# Patient Record
Sex: MALE | Race: WHITE | NOT HISPANIC OR LATINO | URBAN - METROPOLITAN AREA
[De-identification: names, ages, dates, MRNs, and addresses within clinical notes are randomized per-mention and may not be internally consistent; named-entity substitution may affect disease eponyms.]

---

## 2017-06-27 ENCOUNTER — NEW REFERRAL (OUTPATIENT)
Dept: URBAN - METROPOLITAN AREA CLINIC 14 | Facility: CLINIC | Age: 52
End: 2017-06-27

## 2017-06-27 DIAGNOSIS — H44.23: ICD-10-CM

## 2017-06-27 DIAGNOSIS — H35.372: ICD-10-CM

## 2017-06-27 DIAGNOSIS — H25.812: ICD-10-CM

## 2017-06-27 PROCEDURE — 92225 OPHTHALMOSCOPY (INITIAL): CPT

## 2017-06-27 PROCEDURE — 92134 CPTRZ OPH DX IMG PST SGM RTA: CPT

## 2017-06-27 PROCEDURE — G8427 DOCREV CUR MEDS BY ELIG CLIN: HCPCS

## 2017-06-27 PROCEDURE — 1036F TOBACCO NON-USER: CPT

## 2017-06-27 PROCEDURE — 99244 OFF/OP CNSLTJ NEW/EST MOD 40: CPT

## 2017-06-27 ASSESSMENT — VISUAL ACUITY
OS_CC: 20/100-1
OD_CC: 20/25
OS_PH: 20/40-1
OD_PH: 20/20

## 2017-06-27 ASSESSMENT — TONOMETRY
OS_IOP_MMHG: 16
OD_IOP_MMHG: 15

## 2018-04-21 ENCOUNTER — PROBLEM (OUTPATIENT)
Dept: URBAN - METROPOLITAN AREA CLINIC 19 | Facility: CLINIC | Age: 53
End: 2018-04-21

## 2018-04-21 DIAGNOSIS — S05.12XA: ICD-10-CM

## 2018-04-21 DIAGNOSIS — H25.812: ICD-10-CM

## 2018-04-21 DIAGNOSIS — H44.23: ICD-10-CM

## 2018-04-21 DIAGNOSIS — H35.372: ICD-10-CM

## 2018-04-21 DIAGNOSIS — H20.012: ICD-10-CM

## 2018-04-21 DIAGNOSIS — H27.122: ICD-10-CM

## 2018-04-21 PROCEDURE — 92134 CPTRZ OPH DX IMG PST SGM RTA: CPT

## 2018-04-21 PROCEDURE — 4040F PNEUMOC VAC/ADMIN/RCVD: CPT | Mod: 8P

## 2018-04-21 PROCEDURE — 92226 OPHTHALMOSCOPY (SUB): CPT

## 2018-04-21 PROCEDURE — 1036F TOBACCO NON-USER: CPT

## 2018-04-21 PROCEDURE — G8427 DOCREV CUR MEDS BY ELIG CLIN: HCPCS

## 2018-04-21 PROCEDURE — 92014 COMPRE OPH EXAM EST PT 1/>: CPT

## 2018-04-21 ASSESSMENT — VISUAL ACUITY
OS_PH: 20/25+2
OD_CC: 20/20
OS_SC: 20/40

## 2018-04-21 ASSESSMENT — TONOMETRY
OD_IOP_MMHG: 07
OS_IOP_MMHG: 11

## 2018-04-26 ENCOUNTER — FOLLOW UP (OUTPATIENT)
Dept: URBAN - METROPOLITAN AREA CLINIC 32 | Facility: CLINIC | Age: 53
End: 2018-04-26

## 2018-04-26 DIAGNOSIS — S05.12XD: ICD-10-CM

## 2018-04-26 DIAGNOSIS — H25.812: ICD-10-CM

## 2018-04-26 DIAGNOSIS — H27.122: ICD-10-CM

## 2018-04-26 DIAGNOSIS — H35.372: ICD-10-CM

## 2018-04-26 DIAGNOSIS — H20.012: ICD-10-CM

## 2018-04-26 DIAGNOSIS — H44.23: ICD-10-CM

## 2018-04-26 PROCEDURE — 92134 CPTRZ OPH DX IMG PST SGM RTA: CPT

## 2018-04-26 PROCEDURE — 92226 OPHTHALMOSCOPY (SUB): CPT

## 2018-04-26 PROCEDURE — 1036F TOBACCO NON-USER: CPT

## 2018-04-26 PROCEDURE — 92014 COMPRE OPH EXAM EST PT 1/>: CPT

## 2018-04-26 PROCEDURE — G8427 DOCREV CUR MEDS BY ELIG CLIN: HCPCS

## 2018-04-26 ASSESSMENT — VISUAL ACUITY
OD_SC: 20/20
OS_SC: 20/20

## 2018-04-26 ASSESSMENT — TONOMETRY
OD_IOP_MMHG: 15
OS_IOP_MMHG: 21

## 2018-05-22 ENCOUNTER — FOLLOW UP (OUTPATIENT)
Dept: URBAN - METROPOLITAN AREA CLINIC 32 | Facility: CLINIC | Age: 53
End: 2018-05-22

## 2018-05-22 DIAGNOSIS — H20.012: ICD-10-CM

## 2018-05-22 DIAGNOSIS — S05.12XD: ICD-10-CM

## 2018-05-22 DIAGNOSIS — H27.122: ICD-10-CM

## 2018-05-22 PROCEDURE — 99213 OFFICE O/P EST LOW 20 MIN: CPT

## 2018-05-22 ASSESSMENT — TONOMETRY: OS_IOP_MMHG: 12

## 2018-05-22 ASSESSMENT — VISUAL ACUITY
OD_SC: 20/16
OS_SC: 20/16

## 2018-07-31 ENCOUNTER — FOLLOW UP (OUTPATIENT)
Dept: URBAN - METROPOLITAN AREA CLINIC 32 | Facility: CLINIC | Age: 53
End: 2018-07-31

## 2018-07-31 DIAGNOSIS — H44.23: ICD-10-CM

## 2018-07-31 DIAGNOSIS — H20.012: ICD-10-CM

## 2018-07-31 DIAGNOSIS — H27.122: ICD-10-CM

## 2018-07-31 DIAGNOSIS — S05.12XD: ICD-10-CM

## 2018-07-31 PROCEDURE — 92014 COMPRE OPH EXAM EST PT 1/>: CPT

## 2018-07-31 PROCEDURE — 92134 CPTRZ OPH DX IMG PST SGM RTA: CPT

## 2018-07-31 PROCEDURE — 92226 OPHTHALMOSCOPY (SUB): CPT

## 2018-07-31 PROCEDURE — 1036F TOBACCO NON-USER: CPT

## 2018-07-31 PROCEDURE — G8427 DOCREV CUR MEDS BY ELIG CLIN: HCPCS

## 2018-07-31 ASSESSMENT — TONOMETRY: OS_IOP_MMHG: 17

## 2018-07-31 ASSESSMENT — VISUAL ACUITY
OS_SC: 20/25
OD_CC: 20/20-1

## 2019-05-04 ENCOUNTER — PROBLEM (OUTPATIENT)
Dept: URBAN - METROPOLITAN AREA CLINIC 51 | Facility: CLINIC | Age: 54
End: 2019-05-04

## 2019-05-04 DIAGNOSIS — H44.23: ICD-10-CM

## 2019-05-04 DIAGNOSIS — H27.122: ICD-10-CM

## 2019-05-04 DIAGNOSIS — S05.12XD: ICD-10-CM

## 2019-05-04 DIAGNOSIS — H20.012: ICD-10-CM

## 2019-05-04 DIAGNOSIS — H35.372: ICD-10-CM

## 2019-05-04 PROCEDURE — 92226 OPHTHALMOSCOPY (SUB): CPT

## 2019-05-04 PROCEDURE — 92134 CPTRZ OPH DX IMG PST SGM RTA: CPT

## 2019-05-04 PROCEDURE — 92014 COMPRE OPH EXAM EST PT 1/>: CPT

## 2019-05-04 ASSESSMENT — VISUAL ACUITY
OS_SC: 20/30-1
OD_PH: 20/150
OS_PH: 20/20
OD_SC: CF 3FT

## 2019-05-04 ASSESSMENT — TONOMETRY
OD_IOP_MMHG: 16
OS_IOP_MMHG: 13

## 2019-05-14 ENCOUNTER — FOLLOW UP (OUTPATIENT)
Dept: URBAN - METROPOLITAN AREA CLINIC 14 | Facility: CLINIC | Age: 54
End: 2019-05-14

## 2019-05-14 DIAGNOSIS — S05.12XD: ICD-10-CM

## 2019-05-14 DIAGNOSIS — H20.012: ICD-10-CM

## 2019-05-14 DIAGNOSIS — H27.122: ICD-10-CM

## 2019-05-14 DIAGNOSIS — H44.23: ICD-10-CM

## 2019-05-14 DIAGNOSIS — H35.372: ICD-10-CM

## 2019-05-14 PROCEDURE — 92014 COMPRE OPH EXAM EST PT 1/>: CPT

## 2019-05-14 PROCEDURE — 92226 OPHTHALMOSCOPY (SUB): CPT

## 2019-05-14 ASSESSMENT — VISUAL ACUITY
OS_SC: 20/20+1
OD_CC: 20/16

## 2019-05-28 ENCOUNTER — FOLLOW UP (OUTPATIENT)
Dept: URBAN - METROPOLITAN AREA CLINIC 14 | Facility: CLINIC | Age: 54
End: 2019-05-28

## 2019-05-28 DIAGNOSIS — H27.122: ICD-10-CM

## 2019-05-28 DIAGNOSIS — H20.012: ICD-10-CM

## 2019-05-28 DIAGNOSIS — H35.372: ICD-10-CM

## 2019-05-28 PROCEDURE — 92012 INTRM OPH EXAM EST PATIENT: CPT

## 2019-05-28 PROCEDURE — 92134 CPTRZ OPH DX IMG PST SGM RTA: CPT

## 2019-05-28 PROCEDURE — 92226 OPHTHALMOSCOPY (SUB): CPT

## 2019-05-28 ASSESSMENT — VISUAL ACUITY
OD_CC: 20/20+3
OS_SC: 20/20

## 2019-05-28 ASSESSMENT — TONOMETRY
OS_IOP_MMHG: 16
OD_IOP_MMHG: 14

## 2019-06-18 ENCOUNTER — FOLLOW UP (OUTPATIENT)
Dept: URBAN - METROPOLITAN AREA CLINIC 14 | Facility: CLINIC | Age: 54
End: 2019-06-18

## 2019-06-18 DIAGNOSIS — H35.372: ICD-10-CM

## 2019-06-18 DIAGNOSIS — H27.122: ICD-10-CM

## 2019-06-18 DIAGNOSIS — H20.012: ICD-10-CM

## 2019-06-18 PROCEDURE — 92134 CPTRZ OPH DX IMG PST SGM RTA: CPT

## 2019-06-18 PROCEDURE — 92014 COMPRE OPH EXAM EST PT 1/>: CPT

## 2019-06-18 PROCEDURE — 92226 OPHTHALMOSCOPY (SUB): CPT

## 2019-06-18 ASSESSMENT — VISUAL ACUITY
OD_CC: 20/16-1
OS_SC: 20/20-1

## 2019-06-18 ASSESSMENT — TONOMETRY: OS_IOP_MMHG: 13

## 2020-01-30 ENCOUNTER — FOLLOW UP (OUTPATIENT)
Dept: URBAN - METROPOLITAN AREA CLINIC 14 | Facility: CLINIC | Age: 55
End: 2020-01-30

## 2020-01-30 DIAGNOSIS — H35.372: ICD-10-CM

## 2020-01-30 DIAGNOSIS — H27.122: ICD-10-CM

## 2020-01-30 DIAGNOSIS — H20.012: ICD-10-CM

## 2020-01-30 DIAGNOSIS — H27.112: ICD-10-CM

## 2020-01-30 PROCEDURE — 92134 CPTRZ OPH DX IMG PST SGM RTA: CPT

## 2020-01-30 PROCEDURE — 92014 COMPRE OPH EXAM EST PT 1/>: CPT

## 2020-01-30 PROCEDURE — 92202 OPSCPY EXTND ON/MAC DRAW: CPT

## 2020-01-30 ASSESSMENT — TONOMETRY
OD_IOP_MMHG: 13
OS_IOP_MMHG: 13

## 2020-01-30 ASSESSMENT — VISUAL ACUITY
OS_PH: 20/50
OD_SC: 20/20-2

## 2020-02-03 ENCOUNTER — PROBLEM (OUTPATIENT)
Dept: URBAN - METROPOLITAN AREA CLINIC 32 | Facility: CLINIC | Age: 55
End: 2020-02-03

## 2020-02-03 DIAGNOSIS — H27.122: ICD-10-CM

## 2020-02-03 DIAGNOSIS — H27.112: ICD-10-CM

## 2020-02-03 DIAGNOSIS — H35.372: ICD-10-CM

## 2020-02-03 DIAGNOSIS — H20.012: ICD-10-CM

## 2020-02-03 PROCEDURE — 92202 OPSCPY EXTND ON/MAC DRAW: CPT

## 2020-02-03 PROCEDURE — 92014 COMPRE OPH EXAM EST PT 1/>: CPT

## 2020-02-03 ASSESSMENT — TONOMETRY: OS_IOP_MMHG: 7

## 2020-02-03 ASSESSMENT — VISUAL ACUITY
OD_CC: 20/16
OS_PH: 20/60-1
OS_SC: 10/200

## 2020-02-18 ENCOUNTER — SURGERY/PROCEDURE (OUTPATIENT)
Dept: URBAN - METROPOLITAN AREA MEDICAL CENTER 2 | Facility: MEDICAL CENTER | Age: 55
End: 2020-02-18

## 2020-02-18 DIAGNOSIS — H27.112: ICD-10-CM

## 2020-02-18 PROCEDURE — 66825 REPOSITION INTRAOCULAR LENS: CPT

## 2020-02-18 PROCEDURE — 67036 REMOVAL OF INNER EYE FLUID: CPT

## 2020-02-19 ENCOUNTER — 1 DAY POST-OP (OUTPATIENT)
Dept: URBAN - METROPOLITAN AREA CLINIC 32 | Facility: CLINIC | Age: 55
End: 2020-02-19

## 2020-02-19 DIAGNOSIS — H27.112: ICD-10-CM

## 2020-02-19 DIAGNOSIS — H20.012: ICD-10-CM

## 2020-02-19 DIAGNOSIS — H35.372: ICD-10-CM

## 2020-02-19 DIAGNOSIS — H27.122: ICD-10-CM

## 2020-02-19 PROCEDURE — 92202 OPSCPY EXTND ON/MAC DRAW: CPT

## 2020-02-19 PROCEDURE — 99024 POSTOP FOLLOW-UP VISIT: CPT

## 2020-02-19 ASSESSMENT — VISUAL ACUITY
OS_SC: 2/200
OD_CC: 20/16

## 2020-02-19 ASSESSMENT — TONOMETRY: OS_IOP_MMHG: 9

## 2020-02-20 ENCOUNTER — PROBLEM (OUTPATIENT)
Dept: URBAN - METROPOLITAN AREA CLINIC 14 | Facility: CLINIC | Age: 55
End: 2020-02-20

## 2020-02-20 DIAGNOSIS — H20.012: ICD-10-CM

## 2020-02-20 DIAGNOSIS — H27.112: ICD-10-CM

## 2020-02-20 DIAGNOSIS — H35.372: ICD-10-CM

## 2020-02-20 DIAGNOSIS — H27.122: ICD-10-CM

## 2020-02-20 PROCEDURE — 92202 OPSCPY EXTND ON/MAC DRAW: CPT

## 2020-02-20 PROCEDURE — 99024 POSTOP FOLLOW-UP VISIT: CPT

## 2020-02-20 ASSESSMENT — TONOMETRY: OS_IOP_MMHG: 10

## 2020-02-20 ASSESSMENT — VISUAL ACUITY
OD_SC: 20/16
OS_SC: 1/200

## 2020-02-23 ENCOUNTER — PROBLEM (OUTPATIENT)
Dept: URBAN - METROPOLITAN AREA CLINIC 96 | Facility: CLINIC | Age: 55
End: 2020-02-23

## 2020-02-23 DIAGNOSIS — H35.372: ICD-10-CM

## 2020-02-23 DIAGNOSIS — H40.052: ICD-10-CM

## 2020-02-23 DIAGNOSIS — H43.12: ICD-10-CM

## 2020-02-23 DIAGNOSIS — H27.112: ICD-10-CM

## 2020-02-23 DIAGNOSIS — H27.122: ICD-10-CM

## 2020-02-23 DIAGNOSIS — H20.012: ICD-10-CM

## 2020-02-23 PROCEDURE — 92201 OPSCPY EXTND RTA DRAW UNI/BI: CPT

## 2020-02-23 PROCEDURE — 92014 COMPRE OPH EXAM EST PT 1/>: CPT

## 2020-02-24 ENCOUNTER — PROBLEM (OUTPATIENT)
Dept: URBAN - METROPOLITAN AREA CLINIC 19 | Facility: CLINIC | Age: 55
End: 2020-02-24

## 2020-02-24 DIAGNOSIS — H27.112: ICD-10-CM

## 2020-02-24 DIAGNOSIS — H40.052: ICD-10-CM

## 2020-02-24 PROCEDURE — 99024 POSTOP FOLLOW-UP VISIT: CPT

## 2020-02-24 ASSESSMENT — TONOMETRY
OD_IOP_MMHG: 13
OS_IOP_MMHG: 10
OD_IOP_MMHG: 54
OD_IOP_MMHG: 43

## 2020-02-24 ASSESSMENT — VISUAL ACUITY
OD_SC: 20/20
OS_SC: 10/200

## 2020-02-25 ENCOUNTER — SAME DAY PO (OUTPATIENT)
Dept: URBAN - METROPOLITAN AREA CLINIC 96 | Facility: CLINIC | Age: 55
End: 2020-02-25

## 2020-02-25 ENCOUNTER — SURGERY/PROCEDURE (OUTPATIENT)
Dept: URBAN - METROPOLITAN AREA MEDICAL CENTER 2 | Facility: MEDICAL CENTER | Age: 55
End: 2020-02-25

## 2020-02-25 DIAGNOSIS — H20.012: ICD-10-CM

## 2020-02-25 DIAGNOSIS — H40.052: ICD-10-CM

## 2020-02-25 DIAGNOSIS — H27.112: ICD-10-CM

## 2020-02-25 PROCEDURE — 66825 REPOSITION INTRAOCULAR LENS: CPT

## 2020-02-25 PROCEDURE — 92201 OPSCPY EXTND RTA DRAW UNI/BI: CPT

## 2020-02-25 PROCEDURE — 99024 POSTOP FOLLOW-UP VISIT: CPT

## 2020-02-25 PROCEDURE — 67036 REMOVAL OF INNER EYE FLUID: CPT

## 2020-02-25 ASSESSMENT — VISUAL ACUITY
OS_SC: 4/200
OD_SC: 20/16

## 2020-02-27 ENCOUNTER — POST-OP CHECK (OUTPATIENT)
Dept: URBAN - METROPOLITAN AREA CLINIC 14 | Facility: CLINIC | Age: 55
End: 2020-02-27

## 2020-02-27 DIAGNOSIS — H27.122: ICD-10-CM

## 2020-02-27 DIAGNOSIS — H35.372: ICD-10-CM

## 2020-02-27 DIAGNOSIS — H27.112: ICD-10-CM

## 2020-02-27 DIAGNOSIS — H40.052: ICD-10-CM

## 2020-02-27 PROCEDURE — 92202 OPSCPY EXTND ON/MAC DRAW: CPT

## 2020-02-27 PROCEDURE — 99024 POSTOP FOLLOW-UP VISIT: CPT

## 2020-02-27 PROCEDURE — 92134 CPTRZ OPH DX IMG PST SGM RTA: CPT

## 2020-02-27 ASSESSMENT — TONOMETRY
OS_IOP_MMHG: 6
OS_IOP_MMHG: 5
OD_IOP_MMHG: 13

## 2020-02-27 ASSESSMENT — VISUAL ACUITY
OD_SC: 20/16-3
OS_SC: 4/200

## 2020-03-02 ENCOUNTER — POST-OP CHECK (OUTPATIENT)
Dept: URBAN - METROPOLITAN AREA CLINIC 14 | Facility: CLINIC | Age: 55
End: 2020-03-02

## 2020-03-02 DIAGNOSIS — H27.112: ICD-10-CM

## 2020-03-02 DIAGNOSIS — H40.052: ICD-10-CM

## 2020-03-02 PROCEDURE — 92201 OPSCPY EXTND RTA DRAW UNI/BI: CPT

## 2020-03-02 PROCEDURE — 99024 POSTOP FOLLOW-UP VISIT: CPT

## 2020-03-02 PROCEDURE — 92134 CPTRZ OPH DX IMG PST SGM RTA: CPT

## 2020-03-02 ASSESSMENT — TONOMETRY
OS_IOP_MMHG: 28
OD_IOP_MMHG: 15

## 2020-03-02 ASSESSMENT — VISUAL ACUITY
OD_SC: 20/16
OS_SC: 10/200

## 2020-03-10 ENCOUNTER — 1 WEEK POST-OP (OUTPATIENT)
Dept: URBAN - METROPOLITAN AREA CLINIC 14 | Facility: CLINIC | Age: 55
End: 2020-03-10

## 2020-03-10 DIAGNOSIS — H35.352: ICD-10-CM

## 2020-03-10 DIAGNOSIS — H35.372: ICD-10-CM

## 2020-03-10 DIAGNOSIS — H44.23: ICD-10-CM

## 2020-03-10 DIAGNOSIS — H40.052: ICD-10-CM

## 2020-03-10 DIAGNOSIS — H27.112: ICD-10-CM

## 2020-03-10 PROCEDURE — 99024 POSTOP FOLLOW-UP VISIT: CPT

## 2020-03-10 PROCEDURE — 92202 OPSCPY EXTND ON/MAC DRAW: CPT

## 2020-03-10 PROCEDURE — 92134 CPTRZ OPH DX IMG PST SGM RTA: CPT

## 2020-03-10 ASSESSMENT — VISUAL ACUITY
OS_SC: 20/125
OS_PH: 20/80-2
OD_CC: 20/16

## 2020-03-10 ASSESSMENT — TONOMETRY
OS_IOP_MMHG: 21
OD_IOP_MMHG: 17

## 2020-03-24 ENCOUNTER — POST-OP CHECK (OUTPATIENT)
Dept: URBAN - METROPOLITAN AREA CLINIC 14 | Facility: CLINIC | Age: 55
End: 2020-03-24

## 2020-03-24 DIAGNOSIS — H27.112: ICD-10-CM

## 2020-03-24 DIAGNOSIS — H40.052: ICD-10-CM

## 2020-03-24 DIAGNOSIS — H35.352: ICD-10-CM

## 2020-03-24 DIAGNOSIS — H35.372: ICD-10-CM

## 2020-03-24 PROCEDURE — 92134 CPTRZ OPH DX IMG PST SGM RTA: CPT

## 2020-03-24 PROCEDURE — 99024 POSTOP FOLLOW-UP VISIT: CPT

## 2020-03-24 PROCEDURE — 92202 OPSCPY EXTND ON/MAC DRAW: CPT

## 2020-03-24 ASSESSMENT — VISUAL ACUITY
OD_CC: 20/16
OS_CC: 20/125

## 2020-03-24 ASSESSMENT — TONOMETRY: OS_IOP_MMHG: 12

## 2020-04-14 ENCOUNTER — POST-OP CHECK (OUTPATIENT)
Dept: URBAN - METROPOLITAN AREA CLINIC 14 | Facility: CLINIC | Age: 55
End: 2020-04-14

## 2020-04-14 DIAGNOSIS — H40.052: ICD-10-CM

## 2020-04-14 DIAGNOSIS — H35.352: ICD-10-CM

## 2020-04-14 DIAGNOSIS — H27.112: ICD-10-CM

## 2020-04-14 DIAGNOSIS — H35.372: ICD-10-CM

## 2020-04-14 PROCEDURE — 99024 POSTOP FOLLOW-UP VISIT: CPT

## 2020-04-14 PROCEDURE — 92202 OPSCPY EXTND ON/MAC DRAW: CPT

## 2020-04-14 PROCEDURE — 92134 CPTRZ OPH DX IMG PST SGM RTA: CPT

## 2020-04-14 ASSESSMENT — VISUAL ACUITY
OD_CC: 20/16
OS_SC: 20/50-2
OS_PH: 20/40

## 2020-04-14 ASSESSMENT — TONOMETRY: OS_IOP_MMHG: 13

## 2020-05-16 ENCOUNTER — POST-OP CHECK (OUTPATIENT)
Dept: URBAN - METROPOLITAN AREA CLINIC 14 | Facility: CLINIC | Age: 55
End: 2020-05-16

## 2020-05-16 VITALS — HEIGHT: 60 IN

## 2020-05-16 DIAGNOSIS — H44.23: ICD-10-CM

## 2020-05-16 DIAGNOSIS — H35.352: ICD-10-CM

## 2020-05-16 DIAGNOSIS — H35.372: ICD-10-CM

## 2020-05-16 DIAGNOSIS — H27.112: ICD-10-CM

## 2020-05-16 PROCEDURE — 92134 CPTRZ OPH DX IMG PST SGM RTA: CPT

## 2020-05-16 PROCEDURE — 99024 POSTOP FOLLOW-UP VISIT: CPT

## 2020-05-16 PROCEDURE — 92202 OPSCPY EXTND ON/MAC DRAW: CPT

## 2020-05-16 ASSESSMENT — VISUAL ACUITY
OS_SC: 20/80+2
OS_CC: 20/60-3
OD_CC: 20/16

## 2020-05-16 ASSESSMENT — TONOMETRY: OS_IOP_MMHG: 16

## 2020-06-18 ENCOUNTER — FOLLOW UP (OUTPATIENT)
Dept: URBAN - METROPOLITAN AREA CLINIC 14 | Facility: CLINIC | Age: 55
End: 2020-06-18

## 2020-06-18 DIAGNOSIS — H35.352: ICD-10-CM

## 2020-06-18 DIAGNOSIS — H27.112: ICD-10-CM

## 2020-06-18 DIAGNOSIS — H20.012: ICD-10-CM

## 2020-06-18 DIAGNOSIS — H44.23: ICD-10-CM

## 2020-06-18 DIAGNOSIS — H35.372: ICD-10-CM

## 2020-06-18 DIAGNOSIS — H27.122: ICD-10-CM

## 2020-06-18 PROCEDURE — 92134 CPTRZ OPH DX IMG PST SGM RTA: CPT

## 2020-06-18 PROCEDURE — 92202 OPSCPY EXTND ON/MAC DRAW: CPT

## 2020-06-18 PROCEDURE — 92014 COMPRE OPH EXAM EST PT 1/>: CPT

## 2020-06-18 ASSESSMENT — VISUAL ACUITY
OD_CC: 20/16
OS_CC: 20/80

## 2020-06-18 ASSESSMENT — TONOMETRY: OS_IOP_MMHG: 18

## 2020-08-03 ENCOUNTER — FOLLOW UP (OUTPATIENT)
Dept: URBAN - METROPOLITAN AREA CLINIC 14 | Facility: CLINIC | Age: 55
End: 2020-08-03

## 2020-08-03 VITALS — HEIGHT: 60 IN

## 2020-08-03 DIAGNOSIS — H35.352: ICD-10-CM

## 2020-08-03 DIAGNOSIS — H44.23: ICD-10-CM

## 2020-08-03 DIAGNOSIS — H27.112: ICD-10-CM

## 2020-08-03 DIAGNOSIS — H35.372: ICD-10-CM

## 2020-08-03 DIAGNOSIS — H27.122: ICD-10-CM

## 2020-08-03 PROCEDURE — 92134 CPTRZ OPH DX IMG PST SGM RTA: CPT

## 2020-08-03 PROCEDURE — 92202 OPSCPY EXTND ON/MAC DRAW: CPT

## 2020-08-03 PROCEDURE — 92014 COMPRE OPH EXAM EST PT 1/>: CPT

## 2020-08-03 ASSESSMENT — TONOMETRY: OS_IOP_MMHG: 15

## 2020-08-03 ASSESSMENT — VISUAL ACUITY
OD_CC: 20/20+1
OS_PH: 20/100
OS_SC: 10/200

## 2020-08-11 ENCOUNTER — SURGERY/PROCEDURE (OUTPATIENT)
Dept: URBAN - METROPOLITAN AREA MEDICAL CENTER 2 | Facility: MEDICAL CENTER | Age: 55
End: 2020-08-11

## 2020-08-11 DIAGNOSIS — H27.132: ICD-10-CM

## 2020-08-11 PROCEDURE — 66986 EXCHANGE LENS PROSTHESIS: CPT

## 2020-08-11 PROCEDURE — 67036 REMOVAL OF INNER EYE FLUID: CPT

## 2020-08-12 ENCOUNTER — 1 DAY POST-OP (OUTPATIENT)
Dept: URBAN - METROPOLITAN AREA CLINIC 19 | Facility: CLINIC | Age: 55
End: 2020-08-12

## 2020-08-12 DIAGNOSIS — H44.442: ICD-10-CM

## 2020-08-12 DIAGNOSIS — H27.122: ICD-10-CM

## 2020-08-12 PROCEDURE — 92201 OPSCPY EXTND RTA DRAW UNI/BI: CPT

## 2020-08-12 PROCEDURE — 99024 POSTOP FOLLOW-UP VISIT: CPT

## 2020-08-12 ASSESSMENT — TONOMETRY
OD_IOP_MMHG: 15
OS_IOP_MMHG: 3

## 2020-08-12 ASSESSMENT — VISUAL ACUITY
OS_SC: 4/200
OD_CC: 20/20

## 2020-08-13 ENCOUNTER — POST-OP CHECK (OUTPATIENT)
Dept: URBAN - METROPOLITAN AREA CLINIC 14 | Facility: CLINIC | Age: 55
End: 2020-08-13

## 2020-08-13 VITALS — HEIGHT: 60 IN

## 2020-08-13 DIAGNOSIS — H27.122: ICD-10-CM

## 2020-08-13 DIAGNOSIS — H44.442: ICD-10-CM

## 2020-08-13 PROCEDURE — 99024 POSTOP FOLLOW-UP VISIT: CPT

## 2020-08-13 PROCEDURE — 92134 CPTRZ OPH DX IMG PST SGM RTA: CPT

## 2020-08-13 PROCEDURE — 76512 OPH US DX B-SCAN: CPT

## 2020-08-13 ASSESSMENT — TONOMETRY
OD_IOP_MMHG: 18
OS_IOP_MMHG: 16

## 2020-08-13 ASSESSMENT — VISUAL ACUITY
OS_SC: 20/100
OD_CC: 20/16

## 2020-08-17 ENCOUNTER — POST-OP CHECK (OUTPATIENT)
Dept: URBAN - METROPOLITAN AREA CLINIC 14 | Facility: CLINIC | Age: 55
End: 2020-08-17

## 2020-08-17 VITALS — HEIGHT: 60 IN

## 2020-08-17 DIAGNOSIS — H27.122: ICD-10-CM

## 2020-08-17 DIAGNOSIS — H44.442: ICD-10-CM

## 2020-08-17 PROCEDURE — 92134 CPTRZ OPH DX IMG PST SGM RTA: CPT

## 2020-08-17 PROCEDURE — 99024 POSTOP FOLLOW-UP VISIT: CPT

## 2020-08-17 PROCEDURE — 92202 OPSCPY EXTND ON/MAC DRAW: CPT

## 2020-08-17 ASSESSMENT — VISUAL ACUITY
OD_CC: 20/16
OS_SC: 20/160

## 2020-08-17 ASSESSMENT — TONOMETRY: OS_IOP_MMHG: 13

## 2020-08-31 ENCOUNTER — POST-OP CHECK (OUTPATIENT)
Dept: URBAN - METROPOLITAN AREA CLINIC 14 | Facility: CLINIC | Age: 55
End: 2020-08-31

## 2020-08-31 DIAGNOSIS — H35.352: ICD-10-CM

## 2020-08-31 DIAGNOSIS — H35.372: ICD-10-CM

## 2020-08-31 DIAGNOSIS — H27.122: ICD-10-CM

## 2020-08-31 DIAGNOSIS — H44.23: ICD-10-CM

## 2020-08-31 PROCEDURE — 92202 OPSCPY EXTND ON/MAC DRAW: CPT

## 2020-08-31 PROCEDURE — 99024 POSTOP FOLLOW-UP VISIT: CPT

## 2020-08-31 PROCEDURE — 92134 CPTRZ OPH DX IMG PST SGM RTA: CPT

## 2020-08-31 ASSESSMENT — TONOMETRY: OS_IOP_MMHG: 16

## 2020-08-31 ASSESSMENT — VISUAL ACUITY: OD_CC: 20/16

## 2020-09-21 ENCOUNTER — POST-OP CHECK (OUTPATIENT)
Dept: URBAN - METROPOLITAN AREA CLINIC 14 | Facility: CLINIC | Age: 55
End: 2020-09-21

## 2020-09-21 VITALS — HEIGHT: 60 IN

## 2020-09-21 DIAGNOSIS — H35.352: ICD-10-CM

## 2020-09-21 DIAGNOSIS — H27.112: ICD-10-CM

## 2020-09-21 DIAGNOSIS — H44.23: ICD-10-CM

## 2020-09-21 DIAGNOSIS — H27.122: ICD-10-CM

## 2020-09-21 DIAGNOSIS — H35.372: ICD-10-CM

## 2020-09-21 PROCEDURE — 92134 CPTRZ OPH DX IMG PST SGM RTA: CPT

## 2020-09-21 PROCEDURE — 92250 FUNDUS PHOTOGRAPHY W/I&R: CPT

## 2020-09-21 PROCEDURE — 99024 POSTOP FOLLOW-UP VISIT: CPT

## 2020-09-21 PROCEDURE — 92202 OPSCPY EXTND ON/MAC DRAW: CPT

## 2020-09-21 PROCEDURE — 92235 FLUORESCEIN ANGRPH MLTIFRAME: CPT

## 2020-09-21 ASSESSMENT — TONOMETRY
OD_IOP_MMHG: 10
OS_IOP_MMHG: 12

## 2020-09-21 ASSESSMENT — VISUAL ACUITY
OD_CC: 20/16
OS_AM: 20/70
OS_CC: 10/200

## 2020-10-19 ENCOUNTER — POST-OP CHECK (OUTPATIENT)
Dept: URBAN - METROPOLITAN AREA CLINIC 14 | Facility: CLINIC | Age: 55
End: 2020-10-19

## 2020-10-19 DIAGNOSIS — H27.112: ICD-10-CM

## 2020-10-19 DIAGNOSIS — H44.23: ICD-10-CM

## 2020-10-19 DIAGNOSIS — H35.352: ICD-10-CM

## 2020-10-19 DIAGNOSIS — H27.122: ICD-10-CM

## 2020-10-19 DIAGNOSIS — H35.372: ICD-10-CM

## 2020-10-19 PROCEDURE — 92202 OPSCPY EXTND ON/MAC DRAW: CPT

## 2020-10-19 PROCEDURE — 99024 POSTOP FOLLOW-UP VISIT: CPT

## 2020-10-19 PROCEDURE — 92134 CPTRZ OPH DX IMG PST SGM RTA: CPT

## 2020-10-19 ASSESSMENT — VISUAL ACUITY
OS_PH: 20/160-1
OD_CC: 20/16-2

## 2020-10-19 ASSESSMENT — TONOMETRY
OS_IOP_MMHG: 16
OD_IOP_MMHG: 17

## 2021-01-18 ENCOUNTER — FOLLOW UP (OUTPATIENT)
Dept: URBAN - METROPOLITAN AREA CLINIC 14 | Facility: CLINIC | Age: 56
End: 2021-01-18

## 2021-01-18 VITALS — HEIGHT: 60 IN

## 2021-01-18 DIAGNOSIS — H44.23: ICD-10-CM

## 2021-01-18 DIAGNOSIS — H35.372: ICD-10-CM

## 2021-01-18 DIAGNOSIS — H35.352: ICD-10-CM

## 2021-01-18 DIAGNOSIS — H27.122: ICD-10-CM

## 2021-01-18 DIAGNOSIS — H27.112: ICD-10-CM

## 2021-01-18 PROCEDURE — 92202 OPSCPY EXTND ON/MAC DRAW: CPT

## 2021-01-18 PROCEDURE — 92014 COMPRE OPH EXAM EST PT 1/>: CPT

## 2021-01-18 PROCEDURE — 92134 CPTRZ OPH DX IMG PST SGM RTA: CPT

## 2021-01-18 ASSESSMENT — VISUAL ACUITY
OS_CC: 20/200
OD_CC: 20/16

## 2021-01-18 ASSESSMENT — TONOMETRY
OS_IOP_MMHG: 17
OD_IOP_MMHG: 18

## 2021-08-30 ENCOUNTER — FOLLOW UP (OUTPATIENT)
Dept: URBAN - METROPOLITAN AREA CLINIC 14 | Facility: CLINIC | Age: 56
End: 2021-08-30

## 2021-08-30 DIAGNOSIS — H44.23: ICD-10-CM

## 2021-08-30 DIAGNOSIS — H35.372: ICD-10-CM

## 2021-08-30 DIAGNOSIS — H35.352: ICD-10-CM

## 2021-08-30 DIAGNOSIS — H27.122: ICD-10-CM

## 2021-08-30 DIAGNOSIS — H27.112: ICD-10-CM

## 2021-08-30 PROCEDURE — 92134 CPTRZ OPH DX IMG PST SGM RTA: CPT

## 2021-08-30 PROCEDURE — 92014 COMPRE OPH EXAM EST PT 1/>: CPT

## 2021-08-30 PROCEDURE — 92202 OPSCPY EXTND ON/MAC DRAW: CPT

## 2021-08-30 ASSESSMENT — TONOMETRY
OS_IOP_MMHG: 21
OS_IOP_MMHG: 22
OD_IOP_MMHG: 18

## 2021-08-30 ASSESSMENT — VISUAL ACUITY
OD_CC: 20/16
OS_SC: 20/100

## 2021-11-27 ENCOUNTER — PROBLEM (OUTPATIENT)
Dept: URBAN - METROPOLITAN AREA CLINIC 32 | Facility: CLINIC | Age: 56
End: 2021-11-27

## 2021-11-27 DIAGNOSIS — H04.123: ICD-10-CM

## 2021-11-27 DIAGNOSIS — H27.112: ICD-10-CM

## 2021-11-27 DIAGNOSIS — H27.122: ICD-10-CM

## 2021-11-27 DIAGNOSIS — H44.23: ICD-10-CM

## 2021-11-27 DIAGNOSIS — H35.372: ICD-10-CM

## 2021-11-27 DIAGNOSIS — H35.352: ICD-10-CM

## 2021-11-27 PROCEDURE — 92202 OPSCPY EXTND ON/MAC DRAW: CPT

## 2021-11-27 PROCEDURE — 92134 CPTRZ OPH DX IMG PST SGM RTA: CPT

## 2021-11-27 PROCEDURE — 92014 COMPRE OPH EXAM EST PT 1/>: CPT

## 2021-11-27 ASSESSMENT — VISUAL ACUITY
OS_SC: 20/100
OD_CC: 20/16

## 2021-11-27 ASSESSMENT — TONOMETRY
OD_IOP_MMHG: 7
OS_IOP_MMHG: 13

## 2022-05-23 ENCOUNTER — FOLLOW UP (OUTPATIENT)
Dept: URBAN - METROPOLITAN AREA CLINIC 14 | Facility: CLINIC | Age: 57
End: 2022-05-23

## 2022-05-23 DIAGNOSIS — H35.372: ICD-10-CM

## 2022-05-23 DIAGNOSIS — H44.23: ICD-10-CM

## 2022-05-23 DIAGNOSIS — H27.122: ICD-10-CM

## 2022-05-23 DIAGNOSIS — H35.352: ICD-10-CM

## 2022-05-23 PROCEDURE — 92202 OPSCPY EXTND ON/MAC DRAW: CPT

## 2022-05-23 PROCEDURE — 92014 COMPRE OPH EXAM EST PT 1/>: CPT

## 2022-05-23 PROCEDURE — 92134 CPTRZ OPH DX IMG PST SGM RTA: CPT

## 2022-05-23 ASSESSMENT — VISUAL ACUITY
OS_SC: 20/80
OD_CC: 20/16

## 2022-05-23 ASSESSMENT — TONOMETRY
OD_IOP_MMHG: 17
OS_IOP_MMHG: 20
OS_IOP_MMHG: 19

## 2023-09-25 ENCOUNTER — FOLLOW UP (OUTPATIENT)
Dept: URBAN - METROPOLITAN AREA CLINIC 14 | Facility: CLINIC | Age: 58
End: 2023-09-25

## 2023-09-25 DIAGNOSIS — H44.23: ICD-10-CM

## 2023-09-25 DIAGNOSIS — H35.372: ICD-10-CM

## 2023-09-25 DIAGNOSIS — H35.352: ICD-10-CM

## 2023-09-25 DIAGNOSIS — H27.122: ICD-10-CM

## 2023-09-25 PROCEDURE — 92014 COMPRE OPH EXAM EST PT 1/>: CPT

## 2023-09-25 PROCEDURE — 92134 CPTRZ OPH DX IMG PST SGM RTA: CPT

## 2023-09-25 PROCEDURE — 92202 OPSCPY EXTND ON/MAC DRAW: CPT

## 2023-09-25 ASSESSMENT — VISUAL ACUITY
OD_SC: 20/16
OS_SC: 20/100

## 2023-09-25 ASSESSMENT — TONOMETRY
OS_IOP_MMHG: 18
OD_IOP_MMHG: 14

## 2024-09-05 ENCOUNTER — APPOINTMENT (RX ONLY)
Dept: URBAN - METROPOLITAN AREA CLINIC 89 | Facility: CLINIC | Age: 59
Setting detail: DERMATOLOGY
End: 2024-09-05

## 2024-09-05 DIAGNOSIS — Z71.89 OTHER SPECIFIED COUNSELING: ICD-10-CM

## 2024-09-05 DIAGNOSIS — D22 MELANOCYTIC NEVI: ICD-10-CM

## 2024-09-05 DIAGNOSIS — B07.8 OTHER VIRAL WARTS: ICD-10-CM

## 2024-09-05 DIAGNOSIS — L81.4 OTHER MELANIN HYPERPIGMENTATION: ICD-10-CM

## 2024-09-05 PROBLEM — D22.39 MELANOCYTIC NEVI OF OTHER PARTS OF FACE: Status: ACTIVE | Noted: 2024-09-05

## 2024-09-05 PROBLEM — D22.5 MELANOCYTIC NEVI OF TRUNK: Status: ACTIVE | Noted: 2024-09-05

## 2024-09-05 PROBLEM — D23.61 OTHER BENIGN NEOPLASM OF SKIN OF RIGHT UPPER LIMB, INCLUDING SHOULDER: Status: ACTIVE | Noted: 2024-09-05

## 2024-09-05 PROCEDURE — ? COUNSELING

## 2024-09-05 PROCEDURE — ? LIQUID NITROGEN

## 2024-09-05 PROCEDURE — ? OBSERVATION

## 2024-09-05 PROCEDURE — 17110 DESTRUCTION B9 LES UP TO 14: CPT

## 2024-09-05 PROCEDURE — 99203 OFFICE O/P NEW LOW 30 MIN: CPT | Mod: 25

## 2024-09-05 ASSESSMENT — LOCATION DETAILED DESCRIPTION DERM
LOCATION DETAILED: LEFT INFERIOR MUCOSAL LIP
LOCATION DETAILED: RIGHT MEDIAL UPPER BACK
LOCATION DETAILED: LEFT ULNAR DORSAL HAND
LOCATION DETAILED: RIGHT SUPERIOR MEDIAL UPPER BACK
LOCATION DETAILED: LEFT CENTRAL MALAR CHEEK

## 2024-09-05 ASSESSMENT — LOCATION SIMPLE DESCRIPTION DERM
LOCATION SIMPLE: RIGHT UPPER BACK
LOCATION SIMPLE: LEFT INFERIOR MUCOSAL LIP
LOCATION SIMPLE: LEFT CHEEK
LOCATION SIMPLE: LEFT HAND

## 2024-09-05 ASSESSMENT — LOCATION ZONE DERM
LOCATION ZONE: FACE
LOCATION ZONE: LIP
LOCATION ZONE: TRUNK
LOCATION ZONE: HAND

## 2024-09-05 NOTE — PROCEDURE: LIQUID NITROGEN
Number Of Freeze-Thaw Cycles: 2 freeze-thaw cycles
Show Aperture Variable?: Yes
Post-Care Instructions: I reviewed with the patient in detail post-care instructions. Patient is to wear sunprotection, and avoid picking at any of the treated lesions. Pt may apply Vaseline to crusted or scabbing areas.
Spray Paint Text: The liquid nitrogen was applied to the skin utilizing a spray paint frosting technique.
Include Z78.9 (Other Specified Conditions Influencing Health Status) As An Associated Diagnosis?: No
Medical Necessity Clause: This procedure was medically necessary because the lesions that were treated were:
Consent: The patient's consent was obtained including but not limited to risks of crusting, scabbing, blistering, scarring, darker or lighter pigmentary change, recurrence, incomplete removal and infection.
Medical Necessity Information: It is in your best interest to select a reason for this procedure from the list below. All of these items fulfill various CMS LCD requirements except the new and changing color options.
Detail Level: Detailed

## 2024-09-05 NOTE — PROCEDURE: MIPS QUALITY
Quality 130: Documentation Of Current Medications In The Medical Record: Current Medications Documented
Quality 431: Preventive Care And Screening: Unhealthy Alcohol Use - Screening: Patient not identified as an unhealthy alcohol user when screened for unhealthy alcohol use using a systematic screening method
Quality 394a: Meningococcal Immunizations For Adolescents: Patient had anaphylaxis due to the meningococcal vaccine any time on or before the patient’s 15th birthday
Detail Level: Detailed
Quality 226: Preventive Care And Screening: Tobacco Use: Screening And Cessation Intervention: Patient screened for tobacco use and is an ex/non-smoker

## 2024-09-05 NOTE — HPI: EVALUATION OF SKIN LESION(S)
CONGESTION/COUGH
What Type Of Note Output Would You Prefer (Optional)?: Standard Output
How Severe Are Your Spot(S)?: moderate
Hpi Title: Evaluation of Skin Lesions

## 2025-05-21 ENCOUNTER — FOLLOW UP (OUTPATIENT)
Dept: URBAN - METROPOLITAN AREA CLINIC 14 | Age: 60
End: 2025-05-21

## 2025-05-21 DIAGNOSIS — H35.372: ICD-10-CM

## 2025-05-21 DIAGNOSIS — H27.122: ICD-10-CM

## 2025-05-21 DIAGNOSIS — H44.23: ICD-10-CM

## 2025-05-21 DIAGNOSIS — H35.352: ICD-10-CM

## 2025-05-21 PROCEDURE — 92014 COMPRE OPH EXAM EST PT 1/>: CPT

## 2025-05-21 PROCEDURE — 92202 OPSCPY EXTND ON/MAC DRAW: CPT

## 2025-05-21 PROCEDURE — 92134 CPTRZ OPH DX IMG PST SGM RTA: CPT

## 2025-05-21 ASSESSMENT — TONOMETRY
OS_IOP_MMHG: 17
OD_IOP_MMHG: 14

## 2025-05-21 ASSESSMENT — VISUAL ACUITY
OS_PH: 20/60
OD_CC: 20/16
OS_SC: 20/100

## (undated) RX ORDER — PREDNISOLONE ACETATE 10 MG/ML: 1 SUSPENSION/ DROPS OPHTHALMIC

## (undated) RX ORDER — ATROPINE SULFATE 10 MG/ML: 1 SOLUTION/ DROPS OPHTHALMIC ONCE A DAY

## (undated) RX ORDER — TIMOLOL MALEATE 2.5 MG/ML: 1 SOLUTION OPHTHALMIC ONCE A DAY

## (undated) RX ORDER — LATANOPROST 50 UG/ML: 1 SOLUTION/ DROPS OPHTHALMIC EVERY EVENING

## (undated) RX ORDER — PREDNISOLONE ACETATE 10 MG/ML
1 SUSPENSION/ DROPS OPHTHALMIC ONCE A DAY
Start: 2020-08-03

## (undated) RX ORDER — BRIMONIDINE TARTRATE 2 MG/MG: 1 SOLUTION/ DROPS OPHTHALMIC

## (undated) RX ORDER — CYCLOPENTOLATE HYDROCHLORIDE 10 MG/ML: 1 SOLUTION/ DROPS OPHTHALMIC

## (undated) RX ORDER — TIMOLOL MALEATE 2.5 MG/ML: 1 SOLUTION OPHTHALMIC

## (undated) RX ORDER — OFLOXACIN 3 MG/ML: 1 SOLUTION OPHTHALMIC

## (undated) RX ORDER — BROMFENAC SODIUM 0.7 MG/ML: 1 SOLUTION/ DROPS OPHTHALMIC TWICE A DAY

## (undated) RX ORDER — BRIMONIDINE TARTRATE 2 MG/MG: 1 SOLUTION/ DROPS OPHTHALMIC TWICE A DAY